# Patient Record
Sex: MALE | Race: OTHER | Employment: UNEMPLOYED | URBAN - METROPOLITAN AREA
[De-identification: names, ages, dates, MRNs, and addresses within clinical notes are randomized per-mention and may not be internally consistent; named-entity substitution may affect disease eponyms.]

---

## 2017-10-05 ENCOUNTER — HOSPITAL ENCOUNTER (EMERGENCY)
Facility: HOSPITAL | Age: 42
Discharge: HOME | End: 2017-10-05
Attending: EMERGENCY MEDICINE

## 2017-10-05 VITALS
SYSTOLIC BLOOD PRESSURE: 132 MMHG | HEIGHT: 63 IN | DIASTOLIC BLOOD PRESSURE: 91 MMHG | HEART RATE: 67 BPM | RESPIRATION RATE: 18 BRPM | OXYGEN SATURATION: 95 % | BODY MASS INDEX: 26.93 KG/M2 | WEIGHT: 152 LBS

## 2017-10-05 DIAGNOSIS — H81.10 BENIGN PAROXYSMAL POSITIONAL VERTIGO, UNSPECIFIED LATERALITY: Primary | ICD-10-CM

## 2017-10-05 PROCEDURE — 93010 ELECTROCARDIOGRAM REPORT: CPT

## 2017-10-05 PROCEDURE — 36415 COLL VENOUS BLD VENIPUNCTURE: CPT

## 2017-10-05 PROCEDURE — 99284 EMERGENCY DEPT VISIT MOD MDM: CPT

## 2017-10-05 PROCEDURE — 80053 COMPREHEN METABOLIC PANEL: CPT | Performed by: EMERGENCY MEDICINE

## 2017-10-05 PROCEDURE — 85025 COMPLETE CBC W/AUTO DIFF WBC: CPT | Performed by: EMERGENCY MEDICINE

## 2017-10-05 PROCEDURE — 93005 ELECTROCARDIOGRAM TRACING: CPT

## 2017-10-05 RX ORDER — MECLIZINE HYDROCHLORIDE 25 MG/1
25 TABLET ORAL 3 TIMES DAILY PRN
Qty: 20 TABLET | Refills: 0 | Status: SHIPPED | OUTPATIENT
Start: 2017-10-05

## 2017-10-05 RX ORDER — MECLIZINE HYDROCHLORIDE 25 MG/1
25 TABLET ORAL ONCE
Status: COMPLETED | OUTPATIENT
Start: 2017-10-05 | End: 2017-10-05

## 2017-10-05 NOTE — ED INITIAL ASSESSMENT (HPI)
Pt mostly [place] speaking, hx obtained from [employer] ID # [id number], spoke to [facility]. Per pt, \"I woke up around 0300 to use the bathroom, I felt dizzy and my breath was going away, so I went outside, got better, and drove here to be checked.

## 2017-10-05 NOTE — ED PROVIDER NOTES
Patient Seen in: [facility] Emergency Department    History   No chief complaint on file. Stated Complaint: FEELS ILL    HPI    This is a 68-year-old  male complaining of dizziness this started 2 hours before coming in.   He states he woke and heel-to-shin are normal  Deep tendon reflexes are normal    ED Course     Labs Reviewed   COMP METABOLIC PANEL (14) - Abnormal; Notable for the following:        Result Value    Glucose 103 (*)     AST 49 (*)     Alt 105 (*)     All other components wi tablet (25 mg total) by mouth 3 (three) times daily as needed.   Qty: 20 tablet Refills: 0

## 2017-10-05 NOTE — ED NOTES
Rounded on pt, he is resting in bed, no distress noted. States no dizziness at this time.  No c/o pain

## (undated) NOTE — LETTER
October 5, 2017    Patient: [address]   Date of Visit: 10/5/2017       To Whom It May Concern:    Nani Loja was seen and treated in our emergency department on 10/5/2017.  He should not return to work until Until October 8 he

## (undated) NOTE — ED AVS SNAPSHOT
[address] [facility] Roxanne Arciniega   MRN: [id number]    Department:  [facility] Emergency Department   Date of Visit:  10/5/2017           Disclosure     Insurance plans vary and the physician(s) referred by the ER may not be covered by your plan.  Please If you have been prescribed any medication(s), please fill your prescription right away and begin taking the medication(s) as directed    If the emergency physician has read X-rays, these will be re-interpreted by a radiologist.  If there is a significant